# Patient Record
Sex: MALE
[De-identification: names, ages, dates, MRNs, and addresses within clinical notes are randomized per-mention and may not be internally consistent; named-entity substitution may affect disease eponyms.]

---

## 2023-05-24 ENCOUNTER — HOSPITAL ENCOUNTER (EMERGENCY)
Dept: HOSPITAL 97 - ER | Age: 28
Discharge: HOME | End: 2023-05-24
Payer: SELF-PAY

## 2023-05-24 VITALS — SYSTOLIC BLOOD PRESSURE: 138 MMHG | DIASTOLIC BLOOD PRESSURE: 87 MMHG | OXYGEN SATURATION: 99 %

## 2023-05-24 VITALS — TEMPERATURE: 98.2 F

## 2023-05-24 DIAGNOSIS — F11.23: Primary | ICD-10-CM

## 2023-05-24 LAB
ALBUMIN SERPL BCP-MCNC: 4.1 G/DL (ref 3.4–5)
ALP SERPL-CCNC: 104 U/L (ref 45–117)
ALT SERPL W P-5'-P-CCNC: 43 U/L (ref 16–61)
AST SERPL W P-5'-P-CCNC: 22 U/L (ref 15–37)
BUN BLD-MCNC: 11 MG/DL (ref 7–18)
GLUCOSE SERPLBLD-MCNC: 132 MG/DL (ref 74–106)
HCT VFR BLD CALC: 48.2 % (ref 39.6–49)
LIPASE SERPL-CCNC: 16 U/L (ref 13–75)
LYMPHOCYTES # SPEC AUTO: 2.1 K/UL (ref 0.7–4.9)
MCV RBC: 85.8 FL (ref 80–100)
PMV BLD: 8.6 FL (ref 7.6–11.3)
POTASSIUM SERPL-SCNC: 3.8 MEQ/L (ref 3.5–5.1)
RBC # BLD: 5.61 M/UL (ref 4.33–5.43)
TROPONIN I SERPL HS-MCNC: 62.2 PG/ML (ref ?–58.9)

## 2023-05-24 PROCEDURE — 93005 ELECTROCARDIOGRAM TRACING: CPT

## 2023-05-24 PROCEDURE — 80053 COMPREHEN METABOLIC PANEL: CPT

## 2023-05-24 PROCEDURE — 36415 COLL VENOUS BLD VENIPUNCTURE: CPT

## 2023-05-24 PROCEDURE — 84484 ASSAY OF TROPONIN QUANT: CPT

## 2023-05-24 PROCEDURE — 85025 COMPLETE CBC W/AUTO DIFF WBC: CPT

## 2023-05-24 PROCEDURE — 83690 ASSAY OF LIPASE: CPT

## 2023-05-24 NOTE — EDPHYS
Physician Documentation                                                                           

 [facility]                                                                 

Name: Kareem Reaves                                                                        

Age: 28 yrs                                                                                       

Sex: Male                                                                                         

: 1995                                                                                   

MRN: [id number]                                                                                   

Arrival Date: 2023                                                                          

Time: 11:51                                                                                       

Account#: [id number]                                                                            

Bed 16                                                                                            

Private MD:                                                                                       

ED Physician Toi Zhang                                                                     

HPI:                                                                                              

                                                                                             

16:55 This 28 yrs old  Male presents to ER via Ambulatory with complaints of Drug     rt  

      Withdrawal.                                                                                 

16:55 Presents to the ED with reported Percocet withdrawal symptoms. Patient was taking       rt  

      Percocets, stopped 2 days ago. This was due to back pain has been present for over 1        

      year. Patient states that he is sweaty, has a burning chest pain as well as some            

      nausea. Denies other acute complaints this time. Symptoms are moderate severity, no         

      other aggravating or alleviating factors..                                                  

                                                                                                  

Historical:                                                                                       

- Allergies:                                                                                      

12:22 No Known Allergies;                                                                     iw  

- PMHx:                                                                                           

12:22 Diabetes mellitus; Hypertensive disorder;                                               iw  

                                                                                                  

- Family history:: not pertinent.                                                                 

                                                                                                  

                                                                                                  

ROS:                                                                                              

16:55 Constitutional: Negative for fever, chills, and weight loss, Eyes: Negative for injury, rt  

      pain, redness, and discharge, Respiratory: Negative for shortness of breath, cough,         

      wheezing, and pleuritic chest pain, MS/Extremity: Negative for injury and deformity,        

      Skin: Negative for injury, rash, and discoloration, Neuro: Negative for headache,           

      weakness, numbness, tingling, and seizure, Psych: Negative for depression, anxiety,         

      suicide ideation, homicidal ideation, and hallucinations.                                   

16:55 Cardiovascular: Positive for chest pain, Negative for edema.                                

16:55 Abdomen/GI: Positive for nausea, vomiting, and diarrhea, Negative for abdominal pain.       

                                                                                                  

Exam:                                                                                             

16:55 Constitutional:  This is a well developed, well nourished patient who is awake, alert,  rt  

      and in no acute distress. Head/Face:  Normocephalic, atraumatic. Chest/axilla:  Normal      

      chest wall appearance and motion.  Nontender with no deformity.  No lesions are             

      appreciated. Cardiovascular:  Regular rate and rhythm with a normal S1 and S2.  No          

      gallops, murmurs, or rubs.  Normal PMI, no JVD.  No pulse deficits. Respiratory:  Lungs     

      have equal breath sounds bilaterally, clear to auscultation and percussion.  No rales,      

      rhonchi or wheezes noted.  No increased work of breathing, no retractions or nasal          

      flaring. Abdomen/GI:  Soft, non-tender, with normal bowel sounds.  No distension or         

      tympany.  No guarding or rebound.  No evidence of tenderness throughout. Skin:  Warm,       

      dry with normal turgor.  Normal color with no rashes, no lesions, and no evidence of        

      cellulitis. MS/ Extremity:  Pulses equal, no cyanosis.  Neurovascular intact.  Full,        

      normal range of motion. Neuro:  Awake and alert, GCS 15, oriented to person, place,         

      time, and situation.  Cranial nerves II-XII grossly intact.  Motor strength 5/5 in all      

      extremities.  Sensory grossly intact.  Cerebellar exam normal.  Normal gait. Psych:         

      Awake, alert, with orientation to person, place and time.  Behavior, mood, and affect       

      are within normal limits.                                                                   

16:55 ECG was reviewed by the Attending Physician.                                                

                                                                                                  

Vital Signs:                                                                                      

12:21  / 91; Pulse 68; Resp 16; Temp 98.2; Pulse Ox 100% on R/A;                        iw  

15:30  / 65; Pulse 63; Resp 16; Pulse Ox 100% on R/A;                                   vg1 

16:57  / 87; Pulse 75; Resp 18; Pulse Ox 99% on R/A; Pain 4/10;                         nj1 

16:57 Pain Scale: Adult                                                                       nj1 

                                                                                                  

MDM:                                                                                              

12:21 Patient medically screened.                                                             rt  

19:48 Differential Diagnosis Withdrawal, pancreatitis, ACS, pulmonary embolism. Data          rt  

      reviewed: vital signs, nurses notes, lab test result(s), EKG, radiologic studies.           

      Consideration of Admission/Observation Escalation of care including                         

      admission/observation considered. Patient with symptoms that are very atypical for an       

      acute coronary syndrome. Very minimally elevated troponin is noted without acute            

      ischemic changes on the EKG. At this time, I do not believe the patient requires            

      admission for restratification. Repeat troponin is downtrending, very low suspicion for     

      ACS patient is comfortable discharge at this time. Return precautions were discussed. I     

      considered the following discharge prescriptions or medication management in the            

      emergency department Medications were administered in the Emergency Department. See         

      MAR. Test considered but Not performed: CT: Very low suspicion for pulmonary embolism,      

      CT angiogram not indicated. Care significantly affected by the following chronic            

      conditions: Diabetes, Hypertension. Care significantly affected by the following Social     

      Determinants of Health: Misuse of alcohol and/or drugs. Counseling: I had a detailed        

      discussion with the patient and/or guardian regarding: the historical points, exam          

      findings, and any diagnostic results supporting the discharge/admit diagnosis, lab          

      results, radiology results, the need for outpatient follow up, to return to the             

      emergency department if symptoms worsen or persist or if there are any questions or         

      concerns that arise at home.                                                                

                                                                                                  

                                                                                             

13:21 Order name: CBC with Diff; Complete Time: 14:19                                         rt  

                                                                                             

13:21 Order name: CMP; Complete Time: 14:34                                                   rt  

                                                                                             

13:21 Order name: Lipase; Complete Time: 14:34                                                rt  

                                                                                             

13:21 Order name: Troponin High Sensitivity; Complete Time: 14:34                             rt  

                                                                                             

15:16 Order name: Troponin High Sensitivity; Complete Time: 17:21                             rt  

                                                                                             

13:21 Order name: EKG; Complete Time: 13:21                                                   rt  

                                                                                             

13:21 Order name: EKG - Nurse/Tech; Complete Time: 14:32                                      rt  

                                                                                                  

EC:55 Rate is 77 beats/min. Rhythm is regular, Normal Sinus Rhythm with No ectopy. QRS Axis   rt  

      is Normal. KY interval is normal. QRS interval is normal. QT interval is normal. No Q       

      waves. T waves are Normal. No ST changes noted.                                             

                                                                                                  

Administered Medications:                                                                         

15:25 Drug: Ondansetron PO 4 mg Route: PO;                                                    nj1 

16:12 Follow up: Response: No adverse reaction; Marked relief of symptoms                     vg1 

15:25 Drug: GI Cocktail without Donnatal - (Maalox PO Suspension 30 ml, Lidocaine Mucous      nj1 

      Membrane Liquid 2 % 15 ml) Route: PO;                                                       

16:12 Follow up: Response: No adverse reaction; Marked relief of symptoms                     vg1 

                                                                                                  

                                                                                                  

Disposition Summary:                                                                              

23 17:22                                                                                    

Discharge Ordered                                                                                 

      Location: Home                                                                          rt  

      Problem: new                                                                            rt  

      Symptoms: have improved                                                                 rt  

      Condition: Stable                                                                       rt  

      Diagnosis                                                                                   

        - Opiate withdrawal                                                                   rt  

        - Nausea and vomiting                                                                 rt  

      Followup:                                                                               rt  

        - With: Private Physician                                                                  

        - When: 2 - 3 days                                                                         

        - Reason:                                                                                  

      Discharge Instructions:                                                                     

        - Discharge Summary Sheet                                                             rt  

        - Opioid Withdrawal                                                                   rt  

      Forms:                                                                                      

        - Medication Reconciliation Form                                                      rt  

        - Thank You Letter                                                                    rt  

        - Antibiotic Education                                                                rt  

        - Prescription Opioid Use                                                             rt  

      Prescriptions:                                                                              

        - ondansetron 4 mg Oral Tablet,disintegrating                                              

            - take 1 tablet by ORAL route every 6 hours; 18 tablet; Refills: 0, Product       rt  

      Selection Permitted                                                                         

Signatures:                                                                                       

Dispatcher [employer]                           Louann Brown RN                     RN   iw                                                   

Toi Zhang MD MD   rt                                                   

Jessa Romano RN                         RN   nj1                                                  

Soledad Rutherford                        RN   vg1                                                  

                                                                                                  

**************************************************************************************************

## 2023-05-24 NOTE — ER
Nurse's Notes                                                                                     

 [facility]                                                                 

Name: Kareem Reaves                                                                        

Age: 28 yrs                                                                                       

Sex: Male                                                                                         

: 1995                                                                                   

MRN: [id number]                                                                                   

Arrival Date: 2023                                                                          

Time: 11:51                                                                                       

Account#: [id number]                                                                            

Bed 16                                                                                            

Private MD:                                                                                       

Diagnosis: Opiate withdrawal;Nausea and vomiting                                                  

                                                                                                  

Presentation:                                                                                     

                                                                                             

12:21 Chief complaint: Patient states: stopped Percocet a day or 2 ago, can;t sleep, is       iw  

      having burning chest pain, also has back pain from a previous injury and was supposed       

      to have an MRI. Coronavirus screen: At this time, the client does not indicate any          

      symptoms associated with coronavirus-19. Ebola Screen: Patient negative for fever           

      greater than or equal to 101.5 degrees Fahrenheit, and additional compatible Ebola          

      Virus Disease symptoms Patient denies exposure to infectious person. Patient denies         

      travel to an Ebola-affected area in the 21 days before illness onset. No symptoms or        

      risks identified at this time. Initial Sepsis Screen: Does the patient meet any 2           

      criteria? No. Patient's initial sepsis screen is negative. Does the patient have a          

      suspected source of infection? No. Patient's initial sepsis screen is negative. Risk        

      Assessment: Do you want to hurt yourself or someone else? Patient reports no desire to      

      harm self or others. Onset of symptoms was May 24, 2023.                                    

12:21 Method Of Arrival: Ambulatory                                                           iw  

12:21 Acuity: ZENAIDA 3                                                                           iw  

                                                                                                  

Triage Assessment:                                                                                

16:51 General: Appears in no apparent distress. comfortable, Behavior is calm, cooperative,   nj1 

      appropriate for age.                                                                        

                                                                                                  

Historical:                                                                                       

- Allergies:                                                                                      

12:22 No Known Allergies;                                                                     iw  

- PMHx:                                                                                           

12:22 Diabetes mellitus; Hypertensive disorder;                                               iw  

                                                                                                  

- Family history:: not pertinent.                                                                 

                                                                                                  

                                                                                                  

Screenin:50 [facility] ED Fall Risk Assessment (Adult) History of falling in the last 3 months,       nj1 

      including since admission No falls in past 3 months (0 pts) Confusion or Disorientation     

      No (0 pts) Intoxicated or Sedated No (0 pts) Impaired Gait No (0 pts) Mobility Assist       

      Device Used No (0 pt) Altered Elimination No (0 pt) Score/Fall Risk Level 0 - 2 = Low       

      Risk Oriented to surroundings, Maintained a safe environment, Hourly rounding (assess       

      needs \T\ fall precautionary measures) done. Abuse screen: Denies threats or abuse.         

      Denies injuries from another. Nutritional screening: No deficits noted. Tuberculosis        

      screening: No symptoms or risk factors identified.                                          

                                                                                                  

Assessment:                                                                                       

16:12 Reassessment: Patient appears in no apparent distress at this time. Patient and/or      vg1 

      family updated on plan of care and expected duration. Pain level reassessed. Patient is     

      alert, oriented x 3, equal unlabored respirations, skin warm/dry/pink. pt stated "im        

      feeling a little better".                                                                   

16:50 Reassessment: Patient appears in no apparent distress at this time. Patient and/or      nj1 

      family updated on plan of care and expected duration. Pain level reassessed. Patient is     

      alert, oriented x 3, equal unlabored respirations, skin warm/dry/pink. Patient states       

      feeling better. Patient states symptoms have improved.                                      

16:50 Pain: Complains of pain in chest Pain currently is 4 out of 10 on a pain scale.         nj1 

17:37 Reassessment: Patient appears in no apparent distress at this time. No changes from     vg1 

      previously documented assessment. Patient and/or family updated on plan of care and         

      expected duration. Pain level reassessed. Patient is alert, oriented x 3, equal             

      unlabored respirations, skin warm/dry/pink.                                                 

                                                                                                  

Vital Signs:                                                                                      

12:21  / 91; Pulse 68; Resp 16; Temp 98.2; Pulse Ox 100% on R/A;                        iw  

15:30  / 65; Pulse 63; Resp 16; Pulse Ox 100% on R/A;                                   vg1 

16:57  / 87; Pulse 75; Resp 18; Pulse Ox 99% on R/A; Pain 4/10;                         nj1 

16:57 Pain Scale: Adult                                                                       [facility] 

                                                                                                  

ED Course:                                                                                        

11:59 Patient arrived in ED.                                                                  rg4 

12:01 Toi Zhang MD is Attending Physician.                                            rt  

12:22 Triage completed.                                                                       iw  

12:23 Arm band placed on.                                                                     iw  

14:00 Louann Armstrong, RN is Primary Nurse.                                                   iw  

14:07 Initial lab(s) drawn, by me, sent to lab. Inserted saline lock: 22 gauge in left        iw  

      antecubital area, using aseptic technique. Blood collected.                                 

16:17 Troponin High Sensitivity Sent.                                                         vg1 

16:51 Patient has correct armband on for positive identification. Bed in low position. Call   [facility] 

      light in reach.                                                                             

17:38 No provider procedures requiring assistance completed. IV discontinued, intact,         vg1 

      bleeding controlled, No redness/swelling at site. Pressure dressing applied.                

                                                                                                  

Administered Medications:                                                                         

15:25 Drug: Ondansetron PO 4 mg Route: PO;                                                    nj1 

16:12 Follow up: Response: No adverse reaction; Marked relief of symptoms                     vg1 

15:25 Drug: GI Cocktail without Donnatal - (Maalox PO Suspension 30 ml, Lidocaine Mucous      nj1 

      Membrane Liquid 2 % 15 ml) Route: PO;                                                       

16:12 Follow up: Response: No adverse reaction; Marked relief of symptoms                     vg1 

                                                                                                  

                                                                                                  

Outcome:                                                                                          

17:22 Discharge ordered by MD.                                                                rt  

17:38 Discharged to home ambulatory, with family.                                             vg1 

17:38 Condition: good                                                                             

17:38 Discharge instructions given to patient, Instructed on discharge instructions, follow       

      up and referral plans. medication usage, Demonstrated understanding of instructions,        

      follow-up care, medications, Prescriptions given X 1.                                       

17:38 Patient left the ED.                                                                    vg1 

                                                                                                  

Signatures:                                                                                       

Louann Armstrong, RN                     KERI   iw                                                   

Tanja Rutherford4                                                  

Soledad Rutherford RN RN   vg1                                                  

Toi Zhang MD MD   rt                                                   

Jessa Romano RN                         RN   nj1                                                  

                                                                                                  

**************************************************************************************************

## 2023-05-25 NOTE — EKG
Test Date:    2023-05-24               Test Time:    14:29:28

Technician:   CESAR                                     

                                                     

MEASUREMENT RESULTS:                                       

Intervals:                                           

Rate:         77                                     

OK:           156                                    

QRSD:         82                                     

QT:           412                                    

QTc:          466                                    

Axis:                                                

P:            13                                     

OK:           156                                    

QRS:          76                                     

T:            57                                     

                                                     

INTERPRETIVE STATEMENTS:                                       

                                                     

Normal sinus rhythm

Normal ECG

No previous ECG available for comparison



Electronically Signed On 05-25-23 12:31:45 CDT by Temo Albert